# Patient Record
Sex: MALE | Race: WHITE | NOT HISPANIC OR LATINO | URBAN - METROPOLITAN AREA
[De-identification: names, ages, dates, MRNs, and addresses within clinical notes are randomized per-mention and may not be internally consistent; named-entity substitution may affect disease eponyms.]

---

## 2018-11-03 ENCOUNTER — EMERGENCY (EMERGENCY)
Facility: HOSPITAL | Age: 78
LOS: 1 days | Discharge: HOME | End: 2018-11-03
Attending: EMERGENCY MEDICINE
Payer: MEDICARE

## 2018-11-03 VITALS
DIASTOLIC BLOOD PRESSURE: 106 MMHG | OXYGEN SATURATION: 98 % | TEMPERATURE: 98 F | RESPIRATION RATE: 20 BRPM | HEART RATE: 109 BPM | SYSTOLIC BLOOD PRESSURE: 158 MMHG

## 2018-11-03 VITALS
HEART RATE: 80 BPM | TEMPERATURE: 98 F | RESPIRATION RATE: 20 BRPM | OXYGEN SATURATION: 95 % | SYSTOLIC BLOOD PRESSURE: 109 MMHG | DIASTOLIC BLOOD PRESSURE: 82 MMHG

## 2018-11-03 LAB
ALBUMIN SERPL ELPH-MCNC: 4.5 G/DL — SIGNIFICANT CHANGE UP (ref 3.3–5)
ALP SERPL-CCNC: 76 U/L — SIGNIFICANT CHANGE UP (ref 40–120)
ALT FLD-CCNC: 20 U/L — SIGNIFICANT CHANGE UP (ref 10–45)
ANION GAP SERPL CALC-SCNC: 19 MMOL/L — HIGH (ref 5–17)
APPEARANCE UR: ABNORMAL
AST SERPL-CCNC: 22 U/L — SIGNIFICANT CHANGE UP (ref 10–40)
BACTERIA # UR AUTO: NEGATIVE — SIGNIFICANT CHANGE UP
BASOPHILS # BLD AUTO: 0 K/UL — SIGNIFICANT CHANGE UP (ref 0–0.2)
BASOPHILS NFR BLD AUTO: 0.3 % — SIGNIFICANT CHANGE UP (ref 0–2)
BILIRUB SERPL-MCNC: 0.9 MG/DL — SIGNIFICANT CHANGE UP (ref 0.2–1.2)
BILIRUB UR-MCNC: NEGATIVE — SIGNIFICANT CHANGE UP
BUN SERPL-MCNC: 16 MG/DL — SIGNIFICANT CHANGE UP (ref 7–23)
CALCIUM SERPL-MCNC: 9.5 MG/DL — SIGNIFICANT CHANGE UP (ref 8.4–10.5)
CHLORIDE SERPL-SCNC: 102 MMOL/L — SIGNIFICANT CHANGE UP (ref 96–108)
CO2 SERPL-SCNC: 18 MMOL/L — LOW (ref 22–31)
COLOR SPEC: SIGNIFICANT CHANGE UP
CREAT SERPL-MCNC: 1.19 MG/DL — SIGNIFICANT CHANGE UP (ref 0.5–1.3)
DIFF PNL FLD: ABNORMAL
EOSINOPHIL # BLD AUTO: 0.1 K/UL — SIGNIFICANT CHANGE UP (ref 0–0.5)
EOSINOPHIL NFR BLD AUTO: 0.5 % — SIGNIFICANT CHANGE UP (ref 0–6)
EPI CELLS # UR: 2 /HPF — SIGNIFICANT CHANGE UP
GLUCOSE SERPL-MCNC: 125 MG/DL — HIGH (ref 70–99)
GLUCOSE UR QL: NEGATIVE — SIGNIFICANT CHANGE UP
HCT VFR BLD CALC: 45.8 % — SIGNIFICANT CHANGE UP (ref 39–50)
HGB BLD-MCNC: 15.6 G/DL — SIGNIFICANT CHANGE UP (ref 13–17)
HYALINE CASTS # UR AUTO: 0 /LPF — SIGNIFICANT CHANGE UP (ref 0–2)
KETONES UR-MCNC: NEGATIVE — SIGNIFICANT CHANGE UP
LEUKOCYTE ESTERASE UR-ACNC: NEGATIVE — SIGNIFICANT CHANGE UP
LYMPHOCYTES # BLD AUTO: 0.9 K/UL — LOW (ref 1–3.3)
LYMPHOCYTES # BLD AUTO: 8.2 % — LOW (ref 13–44)
MCHC RBC-ENTMCNC: 32.9 PG — SIGNIFICANT CHANGE UP (ref 27–34)
MCHC RBC-ENTMCNC: 34 GM/DL — SIGNIFICANT CHANGE UP (ref 32–36)
MCV RBC AUTO: 96.8 FL — SIGNIFICANT CHANGE UP (ref 80–100)
MONOCYTES # BLD AUTO: 0.7 K/UL — SIGNIFICANT CHANGE UP (ref 0–0.9)
MONOCYTES NFR BLD AUTO: 6.3 % — SIGNIFICANT CHANGE UP (ref 2–14)
NEUTROPHILS # BLD AUTO: 8.9 K/UL — HIGH (ref 1.8–7.4)
NEUTROPHILS NFR BLD AUTO: 84.7 % — HIGH (ref 43–77)
NITRITE UR-MCNC: NEGATIVE — SIGNIFICANT CHANGE UP
PH UR: 7 — SIGNIFICANT CHANGE UP (ref 5–8)
PLATELET # BLD AUTO: 144 K/UL — LOW (ref 150–400)
POTASSIUM SERPL-MCNC: 3.4 MMOL/L — LOW (ref 3.5–5.3)
POTASSIUM SERPL-SCNC: 3.4 MMOL/L — LOW (ref 3.5–5.3)
PROT SERPL-MCNC: 7.1 G/DL — SIGNIFICANT CHANGE UP (ref 6–8.3)
PROT UR-MCNC: ABNORMAL
RBC # BLD: 4.73 M/UL — SIGNIFICANT CHANGE UP (ref 4.2–5.8)
RBC # FLD: 12.5 % — SIGNIFICANT CHANGE UP (ref 10.3–14.5)
RBC CASTS # UR COMP ASSIST: 1602 /HPF — HIGH (ref 0–4)
SODIUM SERPL-SCNC: 139 MMOL/L — SIGNIFICANT CHANGE UP (ref 135–145)
SP GR SPEC: 1.02 — SIGNIFICANT CHANGE UP (ref 1.01–1.02)
UROBILINOGEN FLD QL: NEGATIVE — SIGNIFICANT CHANGE UP
WBC # BLD: 10.5 K/UL — SIGNIFICANT CHANGE UP (ref 3.8–10.5)
WBC # FLD AUTO: 10.5 K/UL — SIGNIFICANT CHANGE UP (ref 3.8–10.5)
WBC UR QL: 22 /HPF — HIGH (ref 0–5)

## 2018-11-03 PROCEDURE — 81001 URINALYSIS AUTO W/SCOPE: CPT

## 2018-11-03 PROCEDURE — 80053 COMPREHEN METABOLIC PANEL: CPT

## 2018-11-03 PROCEDURE — 99284 EMERGENCY DEPT VISIT MOD MDM: CPT | Mod: 25

## 2018-11-03 PROCEDURE — 85027 COMPLETE CBC AUTOMATED: CPT

## 2018-11-03 PROCEDURE — 51700 IRRIGATION OF BLADDER: CPT

## 2018-11-03 PROCEDURE — 87086 URINE CULTURE/COLONY COUNT: CPT

## 2018-11-03 NOTE — ED PROVIDER NOTE - PLAN OF CARE
You were given a leg bag and quiroz for urinary retention and clots. Please return if you develop worsening lower abdominal pain, decreased urine output or fevers/ chills or complications with your quiroz. Take tylenol 650mg every 4 hours as needed for pain control or discomfort.     1) Please follow-up with Dr Kennedy in 3 days (Monday).  Please call today or tomorrow for an appointment.  If you cannot follow-up with your doctor(s), please return to the ED for any urgent issues.  2) If you have any worsening of symptoms or any other concerns please return to the ED immediately.  3) Please continue taking your home medications as directed.  4) You may have been given a copy of your labs and/or imaging.  Please go over these with your primary care doctor.

## 2018-11-03 NOTE — ED PROVIDER NOTE - CARE PLAN
Principal Discharge DX:	Hematuria  Assessment and plan of treatment:	You were given a leg bag and quiroz for urinary retention and clots. Please return if you develop worsening lower abdominal pain, decreased urine output or fevers/ chills or complications with your quiroz. Take tylenol 650mg every 4 hours as needed for pain control or discomfort.     1) Please follow-up with Dr Kennedy in 3 days (Monday).  Please call today or tomorrow for an appointment.  If you cannot follow-up with your doctor(s), please return to the ED for any urgent issues.  2) If you have any worsening of symptoms or any other concerns please return to the ED immediately.  3) Please continue taking your home medications as directed.  4) You may have been given a copy of your labs and/or imaging.  Please go over these with your primary care doctor.  Secondary Diagnosis:	Urinary retention

## 2018-11-03 NOTE — ED ADULT NURSE NOTE - CHPI ED NUR SYMPTOMS NEG
no nausea/no vomiting/no dysuria/no chills/no diarrhea/no fever/no blood in stool/no burning urination

## 2018-11-03 NOTE — ED ADULT NURSE REASSESSMENT NOTE - NS ED NURSE REASSESS COMMENT FT1
pt quiroz bag is draining clear "pink lemonade" color , no clots noted. pt was instructed to switch bag back in forth when sleeping. RN switched quiroz bag to Leg bag. pt verbalized understands and knows how to use quiroz.

## 2018-11-03 NOTE — ED ADULT NURSE REASSESSMENT NOTE - NS ED NURSE REASSESS COMMENT FT1
Rapp catheter attempted to be inserted using sterile technique. Second RN present to confirm sterility. wasn't able to insert due to foreskin will not pull back. pt has distended abdomen. Bladder Scan had . bright red blood is oozing out of penis. Dr. Calero notified and will call Urology.

## 2018-11-03 NOTE — ED PROVIDER NOTE - NS ED ROS FT
CONST: no fevers, no chills, weight loss, weakness, appetite changes  EYES: no pain, vision loss/dipoplia/decreased vision  ENT: no sore throat, no cough  CV: no chest pain, no palpitations  RESP: no shortness of breath  ABD:  + low  abdominal pain, no nausea, no vomiting  : + hematuria   MSK: no back pain  NEURO: no headache or additional neurologic complaints  HEME: no easy bleeding  SKIN:  no rash CONST: no fevers, no chills, weight loss, weakness, appetite changes  EYES: no pain, vision loss/dipoplia/decreased vision  ENT: no sore throat, no cough  CV: no chest pain, no palpitations  RESP: no shortness of breath  ABD:  + low  abdominal pain, no nausea, no vomiting  : + hematuria , no dysuria, + decreased urination   MSK: no back pain  NEURO: no headache or additional neurologic complaints  HEME: no easy bleeding  SKIN:  no rash

## 2018-11-03 NOTE — CONSULT NOTE ADULT - ASSESSMENT
79 y/o male presenting with gross hematuria and urinary retention. quiroz able to be placed, bladder irrigated and hematuria improved        Rec:  observe in ED for ~30-60min   if urine remains relatively clear can dc home with quiroz to leg bag  f/u with Dr Milo Kennedy on monday 11/5/2018    discussed with urology attending IRIS Kennedy MD

## 2018-11-03 NOTE — ED PROVIDER NOTE - PROGRESS NOTE DETAILS
Abdias PGY2: unable to retract foreskin w/ RN, no notable swelling, paged urology Calero PGY2: feels improved, no more pain, pink urine, cleared from urology standpt, d/c w/ leg bag and f/u monday,, pt understands return precautions Abdias PGY2: At time of discharge, [facility] would not print discharge papers- verbalized instructions, pt w/ experience w/ leg bag and had this happen before, given return precautions and f/u w/ dr maher on monday, pt understands and family as well, 175 cc of pink to clear urine outpt reported by RN, labs within acceptable range,

## 2018-11-03 NOTE — ED PROVIDER NOTE - PHYSICAL EXAMINATION
Head: NCAT  ENT: Airway patent, moist mucous membranes, nasal passageways clear, no pharyngeal erythema or exudates, uvula midline, no cervical lymphadenopathy  Cardiac: Normal rate, normal rhythm, no murmurs/rubs/gallops appreciated  Respiratory: Lungs CTA B/L  Gastrointestinal: +BS, Abdomen soft, nontender, nondistended, no rebound, no guarding, no organomegaly   MSK: No gross abnormalities, FROM of all four extremities, no edema  HEME: Extremities warm, pulses intact and symmetrical in all four extremities  Skin: No rashes, no lesions  Neuro: No gross neurologic deficits, CN II-XII intact, no facial asymmetry, no dysarthria, dysdiadochokinesia, strength equal in all four extremities, no gait abnormality Gen: AAOX3, NAD   Head: NCAT  ENT: Airway patent, moist mucous membranes  Cardiac: Normal rate, normal rhythm, no murmurs/rubs/gallops appreciated  Respiratory: Lungs CTA B/L  Gastrointestinal: +BS, Abdomen soft, TTP suprapubic region, nondistended, no rebound, no guarding, no organomegaly   MSK: No gross abnormalities, FROM of all four extremities, no edema  HEME: Extremities warm, pulses intact and symmetrical in all four extremities  Skin: No rashes, no lesions  Neuro: No gross neurologic deficits Gen: AAOX3, NAD   Head: NCAT  ENT: Airway patent, moist mucous membranes  Cardiac: Normal rate, normal rhythm, no murmurs/rubs/gallops appreciated  Respiratory: Lungs CTA B/L  Gastrointestinal: +BS, Abdomen soft, TTP suprapubic region, nondistended, no rebound, no guarding, no organomegaly   : chaperoned by JOSEFINA marshall- uncircumcised, nontender testicles, wnl , blood at urethral meatus with diffulty retracting foreskin w/out swelling/ pain or obvious abnormality   MSK: No gross abnormalities, FROM of all four extremities, no edema  HEME: Extremities warm, pulses intact and symmetrical in all four extremities  Skin: No rashes, no lesions  Neuro: No gross neurologic deficits

## 2018-11-03 NOTE — ED ADULT NURSE NOTE - OBJECTIVE STATEMENT
78M y/o male presented to the ED c/o suprapubic pain with urinary retention. pt states at 2200 pt felt the urge to urinate and urinated large amount of bright red blood. pt has with some sweats but without associated fevers or chills. Follows with urologist Dr Kennedy, no flank pain, pt has hx of TURP 5 yrs ago. pt denies any blood thinners. takes 81MG ASA daily. Pt denies any chest pain, SOB, N/V/D. pt is on room air with no signs of distress. family at bedside. awaiting MD dispo.

## 2018-11-03 NOTE — PROCEDURE NOTE - NSURITECHNIQUE_GU_A_CORE
Proper hand hygiene was performed/The catheter was appropriately lubricated/The urinary drainage system is closed at the end of the procedure/All applicable medical record documentation is completed/A sterile drape was used to cover all adjacent areas

## 2018-11-03 NOTE — ED PROVIDER NOTE - ATTENDING CONTRIBUTION TO CARE
78 yom pmhx htn, bph, prior turp, pt of uro nika, presents with hematuria since yest evening. reports some suprapubic pain/ distention, no fever or chills. no specific dysuria. states has had similar prior.     ROS:   constitutional - no fever, no chills  eyes - no visual changes, no redness  eent - no sore throat, no nasal congestion  cvs - no chest pain, no leg swelling  resp - no shortness of breath, no cough  gi - no abdominal pain, no vomiting, no diarrhea  gu - no dysuria, + hematuria  msk - no acute back pain, no joint swelling  skin - no rashes, no jaundice  neuro - no headache, no focal weakness  psych - no acute mental health issue     Physical Exam:   constitutional - well appearing, awake and alert, oriented x3  head - no external evidence of trauma  cvs - rrr, no murmurs, no peripheral edema  resp - breath sounds clear and equal bilat  gi - abdomen soft w minimal suprapubic distention, no rigidity, guarding or rebound, bowel sounds present  msk - moving all extremities spontaneously  neuro - alert and oriented x3, no focal deficits, CNs 2-12 grossly intact  skin- no jaundice, warm and dry  psych - mood and affect wnl, no apparent risk to self or others     rn unable to pass quiroz, uro consulted w recs as per their documentation  urine cleared, will dc w quiroz in place. additional verbal instructions regarding diagnosis, return precautions and follow up plan given to pt and/or family. JACKELYN Fox MD

## 2018-11-03 NOTE — ED PROVIDER NOTE - OBJECTIVE STATEMENT
78M hx HTN, BPH s/p TURP c/o hematuria since 6:30 pm with some sweats but without associated fevers or chills. Presents w/ hematuria and lower abdominal pain. Follows with urologist Dr Kennedy, no flank pain 78M hx HTN, BPH s/p TURP c/o hematuria since 6:30 pm with some sweats but without associated fevers or chills. Presents w/ hematuria and lower abdominal pain. Follows with urologist Dr Kennedy, no flank pain, no nausea/ emesis/ diarrhea. Had similar episode 6 years ago and required indwelling quiroz at that time

## 2018-11-04 LAB
CULTURE RESULTS: SIGNIFICANT CHANGE UP
SPECIMEN SOURCE: SIGNIFICANT CHANGE UP

## 2020-12-10 NOTE — CONSULT NOTE ADULT - SUBJECTIVE AND OBJECTIVE BOX
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  pt called his urologist who advised him to increase po intake and f/u on monday  or go to ED if problem worsens.  pt came to ED because became uncomfortable and eventually could not void at all.  Ex smoker.  denies dysuria fever chills flank pain    PAST MEDICAL & SURGICAL HISTORY:    FAMILY HISTORY:    SOCIAL HISTORY:   Tobacco hx:    MEDICATIONS  (STANDING):    MEDICATIONS  (PRN):    Allergies    No Known Allergies    Intolerances        REVIEW OF SYSTEMS: Pertinent positives and negatives as stated in HPI, otherwise negative    Vital signs  T(C): 36.8 (18 @ 02:12), Max: 36.8 (18 @ 02:12)  HR: 87 (18 @ 02:12)  BP: 147/108 (18 @ 02:12)  SpO2: 97% (18 @ 02:12)  Wt(kg): --    Output    UOP    Physical Exam  Gen: NAD  Pulm: No intercostal retractions  Back:  Abd: Soft, NT, ND  : Uncircumcised male. 18F coude quiroz placed (see separate) procedure note. ~400cc very dark hematuric urine returned. bladder irrigated with sterile water. ~50cc clot evacuated. urine clear.    LABS:     @ 03:04    WBC 10.5  / Hct 45.8  / SCr 1.19         139  |  102  |  16  ----------------------------<  125<H>  3.4<L>   |  18<L>  |  1.19    Ca    9.5      2018 03:04    TPro  7.1  /  Alb  4.5  /  TBili  0.9  /  DBili  x   /  AST  22  /  ALT  20  /  AlkPhos  76        Urinalysis Basic - ( 2018 02:44 )    Color: DARK BROWN / Appearance: Turbid / S.019 / pH: x  Gluc: x / Ketone: Negative  / Bili: Negative / Urobili: Negative   Blood: x / Protein: 300 mg/dL / Nitrite: Negative   Leuk Esterase: Negative / RBC: 1602 /hpf / WBC 22 /hpf   Sq Epi: x / Non Sq Epi: 2 /hpf / Bacteria: Negative